# Patient Record
Sex: FEMALE | Race: WHITE | Employment: FULL TIME
[De-identification: names, ages, dates, MRNs, and addresses within clinical notes are randomized per-mention and may not be internally consistent; named-entity substitution may affect disease eponyms.]

---

## 2017-09-03 ENCOUNTER — HEALTH MAINTENANCE LETTER (OUTPATIENT)
Age: 32
End: 2017-09-03

## 2019-05-08 ENCOUNTER — TELEPHONE (OUTPATIENT)
Dept: FAMILY MEDICINE | Facility: CLINIC | Age: 34
End: 2019-05-08

## 2019-05-08 ENCOUNTER — OFFICE VISIT (OUTPATIENT)
Dept: URGENT CARE | Facility: URGENT CARE | Age: 34
End: 2019-05-08
Payer: COMMERCIAL

## 2019-05-08 VITALS
OXYGEN SATURATION: 97 % | TEMPERATURE: 98.4 F | BODY MASS INDEX: 40.27 KG/M2 | WEIGHT: 242 LBS | DIASTOLIC BLOOD PRESSURE: 84 MMHG | SYSTOLIC BLOOD PRESSURE: 134 MMHG | HEART RATE: 77 BPM

## 2019-05-08 DIAGNOSIS — B96.89 BV (BACTERIAL VAGINOSIS): ICD-10-CM

## 2019-05-08 DIAGNOSIS — N76.0 BV (BACTERIAL VAGINOSIS): ICD-10-CM

## 2019-05-08 DIAGNOSIS — N12 PYELONEPHRITIS: Primary | ICD-10-CM

## 2019-05-08 DIAGNOSIS — R10.9 ACUTE LEFT FLANK PAIN: ICD-10-CM

## 2019-05-08 PROBLEM — E66.01 MORBID OBESITY (H): Status: ACTIVE | Noted: 2019-05-08

## 2019-05-08 LAB
ALBUMIN SERPL-MCNC: 3.6 G/DL (ref 3.4–5)
ALBUMIN UR-MCNC: NEGATIVE MG/DL
ALP SERPL-CCNC: 46 U/L (ref 40–150)
ALT SERPL W P-5'-P-CCNC: 25 U/L (ref 0–50)
AMYLASE SERPL-CCNC: 47 U/L (ref 30–110)
ANION GAP SERPL CALCULATED.3IONS-SCNC: 8 MMOL/L (ref 3–14)
APPEARANCE UR: CLEAR
AST SERPL W P-5'-P-CCNC: 21 U/L (ref 0–45)
BACTERIA #/AREA URNS HPF: ABNORMAL /HPF
BILIRUB SERPL-MCNC: 0.8 MG/DL (ref 0.2–1.3)
BILIRUB UR QL STRIP: NEGATIVE
BUN SERPL-MCNC: 9 MG/DL (ref 7–30)
CALCIUM SERPL-MCNC: 9.4 MG/DL (ref 8.5–10.1)
CHLORIDE SERPL-SCNC: 103 MMOL/L (ref 94–109)
CO2 SERPL-SCNC: 29 MMOL/L (ref 20–32)
COLOR UR AUTO: YELLOW
CREAT SERPL-MCNC: 0.8 MG/DL (ref 0.52–1.04)
ERYTHROCYTE [DISTWIDTH] IN BLOOD BY AUTOMATED COUNT: 12.1 % (ref 10–15)
GFR SERPL CREATININE-BSD FRML MDRD: >90 ML/MIN/{1.73_M2}
GLUCOSE SERPL-MCNC: 87 MG/DL (ref 70–99)
GLUCOSE UR STRIP-MCNC: NEGATIVE MG/DL
HCG UR QL: NEGATIVE
HCT VFR BLD AUTO: 44.7 % (ref 35–47)
HGB BLD-MCNC: 15.3 G/DL (ref 11.7–15.7)
HGB UR QL STRIP: NEGATIVE
KETONES UR STRIP-MCNC: NEGATIVE MG/DL
LEUKOCYTE ESTERASE UR QL STRIP: ABNORMAL
LIPASE SERPL-CCNC: 129 U/L (ref 73–393)
MCH RBC QN AUTO: 30.5 PG (ref 26.5–33)
MCHC RBC AUTO-ENTMCNC: 34.2 G/DL (ref 31.5–36.5)
MCV RBC AUTO: 89 FL (ref 78–100)
NITRATE UR QL: NEGATIVE
NON-SQ EPI CELLS #/AREA URNS LPF: ABNORMAL /LPF
PH UR STRIP: 7 PH (ref 5–7)
PLATELET # BLD AUTO: 317 10E9/L (ref 150–450)
POTASSIUM SERPL-SCNC: 4.1 MMOL/L (ref 3.4–5.3)
PROT SERPL-MCNC: 7.3 G/DL (ref 6.8–8.8)
RBC # BLD AUTO: 5.01 10E12/L (ref 3.8–5.2)
RBC #/AREA URNS AUTO: ABNORMAL /HPF
SODIUM SERPL-SCNC: 140 MMOL/L (ref 133–144)
SOURCE: ABNORMAL
SP GR UR STRIP: 1.02 (ref 1–1.03)
SPECIMEN SOURCE: ABNORMAL
TSH SERPL DL<=0.005 MIU/L-ACNC: 0.85 MU/L (ref 0.4–4)
UROBILINOGEN UR STRIP-ACNC: 0.2 EU/DL (ref 0.2–1)
WBC # BLD AUTO: 8.7 10E9/L (ref 4–11)
WBC #/AREA URNS AUTO: ABNORMAL /HPF
WET PREP SPEC: ABNORMAL

## 2019-05-08 PROCEDURE — 81001 URINALYSIS AUTO W/SCOPE: CPT | Performed by: PHYSICIAN ASSISTANT

## 2019-05-08 PROCEDURE — 87210 SMEAR WET MOUNT SALINE/INK: CPT | Performed by: PHYSICIAN ASSISTANT

## 2019-05-08 PROCEDURE — 85027 COMPLETE CBC AUTOMATED: CPT | Performed by: PHYSICIAN ASSISTANT

## 2019-05-08 PROCEDURE — 83690 ASSAY OF LIPASE: CPT | Performed by: PHYSICIAN ASSISTANT

## 2019-05-08 PROCEDURE — 82150 ASSAY OF AMYLASE: CPT | Performed by: PHYSICIAN ASSISTANT

## 2019-05-08 PROCEDURE — 81025 URINE PREGNANCY TEST: CPT | Performed by: PHYSICIAN ASSISTANT

## 2019-05-08 PROCEDURE — 84443 ASSAY THYROID STIM HORMONE: CPT | Performed by: PHYSICIAN ASSISTANT

## 2019-05-08 PROCEDURE — 80053 COMPREHEN METABOLIC PANEL: CPT | Performed by: PHYSICIAN ASSISTANT

## 2019-05-08 PROCEDURE — 36415 COLL VENOUS BLD VENIPUNCTURE: CPT | Performed by: PHYSICIAN ASSISTANT

## 2019-05-08 PROCEDURE — 99204 OFFICE O/P NEW MOD 45 MIN: CPT | Performed by: PHYSICIAN ASSISTANT

## 2019-05-08 RX ORDER — METRONIDAZOLE 7.5 MG/G
1 GEL VAGINAL DAILY
Qty: 35 G | Refills: 0 | Status: SHIPPED | OUTPATIENT
Start: 2019-05-08 | End: 2019-05-15

## 2019-05-08 RX ORDER — SULFAMETHOXAZOLE/TRIMETHOPRIM 800-160 MG
1 TABLET ORAL 2 TIMES DAILY
Qty: 14 TABLET | Refills: 0 | Status: SHIPPED | OUTPATIENT
Start: 2019-05-08 | End: 2019-05-15

## 2019-05-08 RX ORDER — METRONIDAZOLE 500 MG/1
500 TABLET ORAL 2 TIMES DAILY
Qty: 14 TABLET | Refills: 0 | Status: SHIPPED | OUTPATIENT
Start: 2019-05-08 | End: 2019-05-15

## 2019-05-08 RX ORDER — OMEGA-3 FATTY ACIDS/FISH OIL 300-1000MG
200 CAPSULE ORAL EVERY 4 HOURS PRN
COMMUNITY

## 2019-05-08 NOTE — PATIENT INSTRUCTIONS
"Improvement in 48 to 72 hours   Return right away with worsening of symptoms as discussed      Patient Education     Bladder Infection, Female (Adult)    Urine is normally doesn't have any bacteria in it. But bacteria can get into the urinary tract from the skin around the rectum. Or they can travel in the blood from elsewhere in the body. Once they are in your urinary tract, they can cause infection in the urethra (urethritis), the bladder (cystitis), or the kidneys (pyelonephritis).  The most common place for an infection is in the bladder. This is called a bladder infection. This is one of the most common infections in women. Most bladder infections are easily treated. They are not serious unless the infection spreads to the kidney.  The phrases \"bladder infection,\" \"UTI,\" and \"cystitis\" are often used to describe the same thing. But they are not always the same. Cystitis is an inflammation of the bladder. The most common cause of cystitis is an infection.  Symptoms  The infection causes inflammation in the urethra and bladder. This causes many of the symptoms. The most common symptoms of a bladder infection are:    Pain or burning when urinating    Having to urinate more often than usual    Urgent need to urinate    Only a small amount of urine comes out    Blood in urine    Abdominal discomfort. This is usually in the lower abdomen above the pubic bone.    Cloudy urine    Strong- or bad-smelling urine    Unable to urinate (urinary retention)    Unable to hold urine in (urinary incontinence)    Fever    Loss of appetite    Confusion (in older adults)  Causes  Bladder infections are not contagious. You can't get one from someone else, from a toilet seat, or from sharing a bath.  The most common cause of bladder infections is bacteria from the bowels. The bacteria get onto the skin around the opening of the urethra. From there, they can get into the urine and travel up to the bladder, causing inflammation and " infection. This usually happens because of:    Wiping improperly after urinating. Always wipe from front to back.    Bowel incontinence    Pregnancy    Procedures such as having a catheter inserted    Older age    Not emptying your bladder. This can allow bacteria a chance to grow in your urine.    Dehydration    Constipation    Sex    Use of a diaphragm for birth control   Treatment  Bladder infections are diagnosed by a urine test. They are treated with antibiotics and usually clear up quickly without complications. Treatment helps prevent a more serious kidney infection.  Medicines  Medicines can help in the treatment of a bladder infection:    Take antibiotics until they are used up, even if you feel better. It is important to finish them to make sure the infection has cleared.    You can use acetaminophen or ibuprofen for pain, fever, or discomfort, unless another medicine was prescribed. If you have chronic liver or kidney disease, talk with your healthcare provider before using these medicines. Also talk with your provider if you've ever had a stomach ulcer or gastrointestinal bleeding, or are taking blood-thinner medicines.    If you are given phenazopydridine to reduce burning with urination, it will cause your urine to become a bright orange color. This can stain clothing.  Care and prevention  These self-care steps can help prevent future infections:    Drink plenty of fluids to prevent dehydration and flush out your bladder. Do this unless you must restrict fluids for other health reasons, or your doctor told you not to.    Proper cleaning after going to the bathroom is important. Wipe from front to back after using the toilet to prevent the spread of bacteria.    Urinate more often. Don't try to hold urine in for a long time.    Wear loose-fitting clothes and cotton underwear. Avoid tight-fitting pants.    Improve your diet and prevent constipation. Eat more fresh fruit and vegetables, and fiber, and  "less junk and fatty foods.    Avoid sex until your symptoms are gone.    Avoid caffeine, alcohol, and spicy foods. These can irritate your bladder.    Urinate right after intercourse to flush out your bladder.    If you use birth control pills and have frequent bladder infections, discuss it with your doctor.  Follow-up care  Call your healthcare provider if all symptoms are not gone after 3 days of treatment. This is especially important if you have repeat infections.  If a culture was done, you will be told if your treatment needs to be changed. If directed, you can call to find out the results.  If X-rays were done, you will be told if the results will affect your treatment.  Call 911  Call 911 if any of the following occur:    Trouble breathing    Hard to wake up or confusion    Fainting or loss of consciousness    Rapid heart rate  When to seek medical advice  Call your healthcare provider right away if any of these occur:    Fever of 100.4 F (38.0 C) or higher, or as directed by your healthcare provider    Symptoms are not better by the third day of treatment    Back or belly (abdominal) pain that gets worse    Repeated vomiting, or unable to keep medicine down    Weakness or dizziness    Vaginal discharge    Pain, redness, or swelling in the outer vaginal area (labia)  Date Last Reviewed: 10/1/2016    7555-4806 [employer]. [address] [place] [address], [place] [address]. All rights reserved. This information is not intended as a substitute for professional medical care. Always follow your healthcare professional's instructions.           Patient Education     Kidney Infection (Adult Female)    An infection in one or both kidneys is called \"pyelonephritis.\" It usually happens when bacteria (or rarely, viruses, fungi, or other disease-causing organisms) get into the kidney. The bacteria (or other disease-causing organisms) can enter the kidneys from the bladder or blood traveling from other parts of " the body. A kidney infection can become serious. It can cause severe illness, scarring of the kidneys, or kidney failure if not treated properly.  Common causes for this problem include:    Not keeping the genital area clean and dry, which promotes the growth of bacteria    Wiping back to front which drags bacteria from the rectum toward the urinary opening (urethra)    Wearing tight pants or underwear (this lets moisture build up in the genital area, which helps bacteria grow)    Holding urine in for long periods of time    Dehydration  Kidney infections can cause symptoms similar to a bladder infection. Symptoms include:    Pain (or burning) when urinating    Having to urinate more often than usual    Blood in the urine (pink or red)    Abdominal pain or discomfort, usually in the lower abdomen    Pain in the side or back    Pain above the pubic bone    Fever or chills    Vomiting    Loss of appetite  Treatment is oral antibiotics, or in more severe cases, intramuscular or IV antibiotics. These are started right away and may be changed once urine culture results determine the infecting organisms. Treatment helps prevent a more serious kidney infection.  Medicines  Medicines can help in the treatment of a bladder infection:    Take antibiotics until they are used up, even if you feel better. It is important to finish them to make sure the infection is gone.    Unless another medicine was prescribed, you can use over-the-counter medicines for pain, fever, or discomfort. If you have chronic liver of kidney disease, talk with your healthcare provider before using these medicines. Also talk with your provider if you've ever had a stomach ulcer or gastrointestinal (GI) bleeding, or are taking blood thinners.  Home care  The following are general care guidelines:    Stay home from work or school. Rest in bed until your fever breaks and you are feeling better, or as advised by your healthcare provider.    Drink lots of  fluid unless you must restrict fluids for other medical reasons. This will force the medicine into your urinary system and flush the bacteria out of your body. Ask your healthcare provider how much you should drink.    Don't have sex until you have finished all of your medicine and your symptoms are gone.    Don't have caffeine, alcohol, or spicy foods. These foods may irritate the kidneys and bladder.    Don't take bubble baths. Sensitivity to the chemicals in bubble baths can irritate the urethra.    Make sure you wipe from front to back after using the toilet.    Wear loose cloths and cotton underwear.  Prevention  These self-care steps can help prevent future infections:    Drink plenty of fluids to prevent dehydration and flush out the bladder. Do this unless you must restrict fluids for other health reasons, or your healthcare provider told you not to.    Proper cleaning after going to the bathroom in important. Make sure you wipe from front to back after using the toilet.    Urinate more often. Don't try to hold urine in for a long time.    Don't wear tight-fitting pants and underwear.    Improve your diet to prevent constipation. Eat more fruits, vegetables, and fiber. Eat less junk and fatty foods. Constipation can make a urinary tract infection more likely. Talk with your healthcare provider if you have trouble with bowel movements.    Urinate right after intercourse to flush out the bladder.  Follow-up care  Follow up with your healthcare provider, or as advised. Additional testing may be needed to make sure the infection has cleared. Close follow-up and further testing is very important to find the cause and to prevent future infections.  If a urine culture was done, you will be contacted if your treatment needs to be changed. If directed, you may call to find out the results.  If you had an X-ray, CT scan, or other diagnostic test, you will be notified of any new findings that may affect your  care.  Call 911  Call 911 if any of the following occur:    Trouble breathing    Fainting or loss of consciousness    Rapid or very slow heart rate    Weakness, dizziness, or fainting    Difficulty arousing or confusion  When to seek medical advice  Call your healthcare provider right away if any of these occur:    Fever 100.4 F (38 C) or higher, or as directed by your healthcare provider    Not feeling better within 1 to 2 days after starting antibiotics    Any symptom that continues after 3 days of treatment    Increasing pain in the stomach, back, side, or groin area    Repeated vomiting    Not able to take prescribed medicine due to nausea or another reason    Bloody, dark-colored, or foul smelling urine    Trouble urinating or decreased urine output    No urine for 8 hours, no tears when crying, sunken eyes, or dry mouth  Date Last Reviewed: 10/1/2016    [id number] [employer]. [address] [place] [address]. All rights reserved. This information is not intended as a substitute for professional medical care. Always follow your healthcare professional's instructions.           Patient Education     Bacterial Vaginosis    You have a vaginal infection called bacterial vaginosis (BV). Both good and bad bacteria are present in a healthy vagina. BV occurs when these bacteria get out of balance. The number of bad bacteria increase. And the number of good bacteria decrease. Although BV is associated with sexual activity, it is not a sexually transmitted disease.  BV may or may not cause symptoms. If symptoms do occur, they can include:    Thin, gray, milky-white, or sometimes green discharge    Unpleasant odor or  fishy  smell    Itching, burning, or pain in or around the vagina  It is not known what causes BV, but certain factors can make the problem more likely. This can include:    Douching    Having sex with a new partner    Having sex with more than one partner  BV will sometimes go away on  its own. But treatment is usually recommended. This is because untreated BV can increase the risk of more serious health problems such as:    Pelvic inflammatory disease (PID)     delivery (giving birth to a baby early if you re pregnant)    HIV and certain other sexually transmitted diseases (STDs)    Infection after surgery on the reproductive organs  Home care  General care    BV is most often treated with medicines called antibiotics. These may be given as pills or as a vaginal cream. If antibiotics are prescribed, be sure to use them exactly as directed. Also, be sure to complete all of the medicine, even if your symptoms go away.    Don't douche or having sex during treatment.    If you have sex with a female partner, ask your healthcare provider if she should also be treated.  Prevention    Don't douche.    Don't have sex. If you do have sex, then take steps to lower your risk:  ? Use condoms when having sex.  ? Limit the number of sexual partners you have.  Follow-up care  Follow up with your healthcare provider, or as advised.  When to seek medical advice  Call your healthcare provider right away if:    You have a fever of 100.4 F (38 C) or higher, or as directed by your provider.    Your symptoms worsen, or they don t go away within a few days of starting treatment.    You have new pain in the lower belly or pelvic region.    You have side effects that bother you or a reaction to the pills or cream you re prescribed.    You or any partners you have sex with have new symptoms, such as a rash, joint pain, or sores.  Date Last Reviewed: 10/1/2017    [id number] [employer]. [address] [place] [address], [place] [address]. All rights reserved. This information is not intended as a substitute for professional medical care. Always follow your healthcare professional's instructions.

## 2019-05-08 NOTE — TELEPHONE ENCOUNTER
Called patient- let her know that an rx for flagyl was sent to her pharmacy.    Pt also requested a letter stating she was seen in urgent care today- I will print off work note and leave it at the urg care  for her today    Ilene Coates CMA 5/8/2019 1:55 PM

## 2019-05-08 NOTE — TELEPHONE ENCOUNTER
Reason for call:  Other   Patient called regarding (reason for call): call back  Additional comments: Patient was prescribed metrogel. States that it is too expensive and she is unable to fill it. She is wondering if she needs the metrogel or if she can get by with just the bactrim.  Please call to discuss.     Phone number to reach patient:  Home number on file [phone] (home)    Best Time:  any    Can we leave a detailed message on this number?  YES

## 2019-05-08 NOTE — PROGRESS NOTES
SUBJECTIVE  HPI:  Fiona Esparza is a 33 year old female who presents with the CC of left flank pain, radiating to left CVA.  Nausea. Frequency of urine. Urgency.  No dysuria. History of stones, UTIs (while pregnant).  Increased discharge.  Positive pregnancy test 1 week ago, then negative x3.  The pain is characterized as constant and aching, and tender.  At worst is a level 7 on a scale of 1-10.  Now a 7.  Pain has been present for 1 week(s) and is slowly progressive.  EXACERBATING FACTORS: wearing jeans, pressure, laying on it.  RELIEVING FACTORS: nothing  ASSOCIATED SX: as above.    Switched back [employer] after a few years with [employer] due to insurance    Past Medical History:   Diagnosis Date     Costochondritis      GERD (gastroesophageal reflux disease)    Lithotripsy for stones 3 years ago      Current Outpatient Medications   Medication Sig Dispense Refill     AVIANE 0.1-20 MG-MCG per tablet Take one tablet by mouth one time daily 84 tablet 2     ibuprofen (ADVIL/MOTRIN) 200 MG capsule Take 200 mg by mouth every 4 hours as needed for fever       Social History     Tobacco Use     Smoking status: Never Smoker     Smokeless tobacco: Never Used   Substance Use Topics     Alcohol use: No     Surgical History  Umblical hernia repair 2-5yrs ago  Appendix removed >10 years    Family History  No family history of stones, gall bladder  Paternal grandfather with colon or pancreatic cancer at 74  Mother with thyroid issues      ROS:  10 point ROS negative except as listed above      OBJECTIVE:  /84   Pulse 77   Temp 98.4  F (36.9  C) (Tympanic)   Wt 109.8 kg (242 lb)   SpO2 97%   BMI 40.27 kg/m    GENERAL APPEARANCE: healthy, alert and no distress. Non-ill, non-toxic  EYES: EOMI,  PERRL, conjunctiva clear  HENT: ear canals and TM's normal.  Nose and mouth without ulcers, erythema or lesions  NECK: supple, nontender, no lymphadenopathy  RESP: lungs clear to auscultation - no rales, rhonchi or wheezes  CV:  regular rates and rhythm, normal S1 S2, no murmur noted  BACK: CVA tenderness on left side  ABDOMEN:  soft, nontender, no HSM or masses and bowel sounds normal  NEURO: Normal strength and tone, sensory exam grossly normal,  normal speech and mentation  SKIN: no suspicious lesions or rashes    Results for orders placed or performed in visit on 05/08/19   *UA reflex to Microscopic and Culture ([place] [facility] (except [place] and [place])   Result Value Ref Range    Color Urine Yellow     Appearance Urine Clear     Glucose Urine Negative NEG^Negative mg/dL    Bilirubin Urine Negative NEG^Negative    Ketones Urine Negative NEG^Negative mg/dL    Specific Gravity Urine 1.020 1.003 - 1.035    Blood Urine Negative NEG^Negative    pH Urine 7.0 5.0 - 7.0 pH    Protein Albumin Urine Negative NEG^Negative mg/dL    Urobilinogen Urine 0.2 0.2 - 1.0 EU/dL    Nitrite Urine Negative NEG^Negative    Leukocyte Esterase Urine Small (A) NEG^Negative    Source Midstream Urine    Comprehensive metabolic panel (BMP + Alb, Alk Phos, ALT, AST, Total. Bili, TP)   Result Value Ref Range    Sodium 140 133 - 144 mmol/L    Potassium 4.1 3.4 - 5.3 mmol/L    Chloride 103 94 - 109 mmol/L    Carbon Dioxide 29 20 - 32 mmol/L    Anion Gap 8 3 - 14 mmol/L    Glucose 87 70 - 99 mg/dL    Urea Nitrogen 9 7 - 30 mg/dL    Creatinine 0.80 0.52 - 1.04 mg/dL    GFR Estimate >90 >60 mL/min/[1.73_m2]    GFR Estimate If Black >90 >60 mL/min/[1.73_m2]    Calcium 9.4 8.5 - 10.1 mg/dL    Bilirubin Total 0.8 0.2 - 1.3 mg/dL    Albumin 3.6 3.4 - 5.0 g/dL    Protein Total 7.3 6.8 - 8.8 g/dL    Alkaline Phosphatase 46 40 - 150 U/L    ALT 25 0 - 50 U/L    AST 21 0 - 45 U/L   HCG Qual, Urine ([id number])   Result Value Ref Range    HCG Qual Urine Negative NEG^Negative   CBC with platelets   Result Value Ref Range    WBC 8.7 4.0 - 11.0 10e9/L    RBC Count 5.01 3.8 - 5.2 10e12/L    Hemoglobin 15.3 11.7 - 15.7 g/dL    Hematocrit 44.7 35.0 - 47.0 %    MCV 89 78 -  100 fl    MCH 30.5 26.5 - 33.0 pg    MCHC 34.2 31.5 - 36.5 g/dL    RDW 12.1 10.0 - 15.0 %    Platelet Count 317 150 - 450 10e9/L   Urine Microscopic   Result Value Ref Range    WBC Urine 5-10 (A) OTO5^0 - 5 /HPF    RBC Urine O - 2 OTO2^O - 2 /HPF    Squamous Epithelial /LPF Urine Moderate (A) FEW^Few /LPF    Bacteria Urine Few (A) NEG^Negative /HPF   Wet prep   Result Value Ref Range    Specimen Description Vagina     Wet Prep No Trichomonas seen     Wet Prep No yeast seen     Wet Prep Moderate  Clue cells seen   (A)     Wet Prep Moderate  WBC'S seen            ASSESSMENT:  (N12) Pyelonephritis  (primary encounter diagnosis)  Comment: nontoxic, non-ill appearing, low WBC.  Holding on rocephin, discussed red flags and close follow up  Plan: sulfamethoxazole-trimethoprim (BACTRIM         DS/SEPTRA DS) 800-160 MG tablet  Red flags and emergent follow up discussed, and understood by patient  Follow up with PCP if symptoms worsen or fail to improve      (R10.9) Acute left flank pain  Plan: *UA reflex to Microscopic and Culture ([place]         and [facility] (except [place] and         [place]), Wet prep, Comprehensive metabolic         panel (BMP + Alb, Alk Phos, ALT, AST, Total.         Bili, TP), Amylase, Lipase, HCG Qual, Urine         ([id number]), CBC with platelets, TSH, Urine         Microscopic      (N76.0,  B96.89) BV (bacterial vaginosis)  Comment: clue cells on wet prep  Plan: metroNIDAZOLE (METROGEL) 0.75 % vaginal gel  Follow up with PCP if symptoms worsen or fail to improve      Patient Instructions   Improvement in 48 to 72 hours   Return right away with worsening of symptoms as discussed      Patient Education     Bladder Infection, Female (Adult)    Urine is normally doesn't have any bacteria in it. But bacteria can get into the urinary tract from the skin around the rectum. Or they can travel in the blood from elsewhere in the body. Once they are in your urinary tract, they can cause infection in  "the urethra (urethritis), the bladder (cystitis), or the kidneys (pyelonephritis).  The most common place for an infection is in the bladder. This is called a bladder infection. This is one of the most common infections in women. Most bladder infections are easily treated. They are not serious unless the infection spreads to the kidney.  The phrases \"bladder infection,\" \"UTI,\" and \"cystitis\" are often used to describe the same thing. But they are not always the same. Cystitis is an inflammation of the bladder. The most common cause of cystitis is an infection.  Symptoms  The infection causes inflammation in the urethra and bladder. This causes many of the symptoms. The most common symptoms of a bladder infection are:    Pain or burning when urinating    Having to urinate more often than usual    Urgent need to urinate    Only a small amount of urine comes out    Blood in urine    Abdominal discomfort. This is usually in the lower abdomen above the pubic bone.    Cloudy urine    Strong- or bad-smelling urine    Unable to urinate (urinary retention)    Unable to hold urine in (urinary incontinence)    Fever    Loss of appetite    Confusion (in older adults)  Causes  Bladder infections are not contagious. You can't get one from someone else, from a toilet seat, or from sharing a bath.  The most common cause of bladder infections is bacteria from the bowels. The bacteria get onto the skin around the opening of the urethra. From there, they can get into the urine and travel up to the bladder, causing inflammation and infection. This usually happens because of:    Wiping improperly after urinating. Always wipe from front to back.    Bowel incontinence    Pregnancy    Procedures such as having a catheter inserted    Older age    Not emptying your bladder. This can allow bacteria a chance to grow in your urine.    Dehydration    Constipation    Sex    Use of a diaphragm for birth control   Treatment  Bladder infections are " diagnosed by a urine test. They are treated with antibiotics and usually clear up quickly without complications. Treatment helps prevent a more serious kidney infection.  Medicines  Medicines can help in the treatment of a bladder infection:    Take antibiotics until they are used up, even if you feel better. It is important to finish them to make sure the infection has cleared.    You can use acetaminophen or ibuprofen for pain, fever, or discomfort, unless another medicine was prescribed. If you have chronic liver or kidney disease, talk with your healthcare provider before using these medicines. Also talk with your provider if you've ever had a stomach ulcer or gastrointestinal bleeding, or are taking blood-thinner medicines.    If you are given phenazopydridine to reduce burning with urination, it will cause your urine to become a bright orange color. This can stain clothing.  Care and prevention  These self-care steps can help prevent future infections:    Drink plenty of fluids to prevent dehydration and flush out your bladder. Do this unless you must restrict fluids for other health reasons, or your doctor told you not to.    Proper cleaning after going to the bathroom is important. Wipe from front to back after using the toilet to prevent the spread of bacteria.    Urinate more often. Don't try to hold urine in for a long time.    Wear loose-fitting clothes and cotton underwear. Avoid tight-fitting pants.    Improve your diet and prevent constipation. Eat more fresh fruit and vegetables, and fiber, and less junk and fatty foods.    Avoid sex until your symptoms are gone.    Avoid caffeine, alcohol, and spicy foods. These can irritate your bladder.    Urinate right after intercourse to flush out your bladder.    If you use birth control pills and have frequent bladder infections, discuss it with your doctor.  Follow-up care  Call your healthcare provider if all symptoms are not gone after 3 days of treatment.  "This is especially important if you have repeat infections.  If a culture was done, you will be told if your treatment needs to be changed. If directed, you can call to find out the results.  If X-rays were done, you will be told if the results will affect your treatment.  Call 911  Call 911 if any of the following occur:    Trouble breathing    Hard to wake up or confusion    Fainting or loss of consciousness    Rapid heart rate  When to seek medical advice  Call your healthcare provider right away if any of these occur:    Fever of 100.4 F (38.0 C) or higher, or as directed by your healthcare provider    Symptoms are not better by the third day of treatment    Back or belly (abdominal) pain that gets worse    Repeated vomiting, or unable to keep medicine down    Weakness or dizziness    Vaginal discharge    Pain, redness, or swelling in the outer vaginal area (labia)  Date Last Reviewed: 10/1/2016    0519-1520 [employer]. [address] [place] [address]. All rights reserved. This information is not intended as a substitute for professional medical care. Always follow your healthcare professional's instructions.           Patient Education     Kidney Infection (Adult Female)    An infection in one or both kidneys is called \"pyelonephritis.\" It usually happens when bacteria (or rarely, viruses, fungi, or other disease-causing organisms) get into the kidney. The bacteria (or other disease-causing organisms) can enter the kidneys from the bladder or blood traveling from other parts of the body. A kidney infection can become serious. It can cause severe illness, scarring of the kidneys, or kidney failure if not treated properly.  Common causes for this problem include:    Not keeping the genital area clean and dry, which promotes the growth of bacteria    Wiping back to front which drags bacteria from the rectum toward the urinary opening (urethra)    Wearing tight pants or underwear (this lets " moisture build up in the genital area, which helps bacteria grow)    Holding urine in for long periods of time    Dehydration  Kidney infections can cause symptoms similar to a bladder infection. Symptoms include:    Pain (or burning) when urinating    Having to urinate more often than usual    Blood in the urine (pink or red)    Abdominal pain or discomfort, usually in the lower abdomen    Pain in the side or back    Pain above the pubic bone    Fever or chills    Vomiting    Loss of appetite  Treatment is oral antibiotics, or in more severe cases, intramuscular or IV antibiotics. These are started right away and may be changed once urine culture results determine the infecting organisms. Treatment helps prevent a more serious kidney infection.  Medicines  Medicines can help in the treatment of a bladder infection:    Take antibiotics until they are used up, even if you feel better. It is important to finish them to make sure the infection is gone.    Unless another medicine was prescribed, you can use over-the-counter medicines for pain, fever, or discomfort. If you have chronic liver of kidney disease, talk with your healthcare provider before using these medicines. Also talk with your provider if you've ever had a stomach ulcer or gastrointestinal (GI) bleeding, or are taking blood thinners.  Home care  The following are general care guidelines:    Stay home from work or school. Rest in bed until your fever breaks and you are feeling better, or as advised by your healthcare provider.    Drink lots of fluid unless you must restrict fluids for other medical reasons. This will force the medicine into your urinary system and flush the bacteria out of your body. Ask your healthcare provider how much you should drink.    Don't have sex until you have finished all of your medicine and your symptoms are gone.    Don't have caffeine, alcohol, or spicy foods. These foods may irritate the kidneys and bladder.    Don't take  bubble baths. Sensitivity to the chemicals in bubble baths can irritate the urethra.    Make sure you wipe from front to back after using the toilet.    Wear loose cloths and cotton underwear.  Prevention  These self-care steps can help prevent future infections:    Drink plenty of fluids to prevent dehydration and flush out the bladder. Do this unless you must restrict fluids for other health reasons, or your healthcare provider told you not to.    Proper cleaning after going to the bathroom in important. Make sure you wipe from front to back after using the toilet.    Urinate more often. Don't try to hold urine in for a long time.    Don't wear tight-fitting pants and underwear.    Improve your diet to prevent constipation. Eat more fruits, vegetables, and fiber. Eat less junk and fatty foods. Constipation can make a urinary tract infection more likely. Talk with your healthcare provider if you have trouble with bowel movements.    Urinate right after intercourse to flush out the bladder.  Follow-up care  Follow up with your healthcare provider, or as advised. Additional testing may be needed to make sure the infection has cleared. Close follow-up and further testing is very important to find the cause and to prevent future infections.  If a urine culture was done, you will be contacted if your treatment needs to be changed. If directed, you may call to find out the results.  If you had an X-ray, CT scan, or other diagnostic test, you will be notified of any new findings that may affect your care.  Call 911  Call 911 if any of the following occur:    Trouble breathing    Fainting or loss of consciousness    Rapid or very slow heart rate    Weakness, dizziness, or fainting    Difficulty arousing or confusion  When to seek medical advice  Call your healthcare provider right away if any of these occur:    Fever 100.4 F (38 C) or higher, or as directed by your healthcare provider    Not feeling better within 1 to 2  days after starting antibiotics    Any symptom that continues after 3 days of treatment    Increasing pain in the stomach, back, side, or groin area    Repeated vomiting    Not able to take prescribed medicine due to nausea or another reason    Bloody, dark-colored, or foul smelling urine    Trouble urinating or decreased urine output    No urine for 8 hours, no tears when crying, sunken eyes, or dry mouth  Date Last Reviewed: 10/1/2016    4679-1356 [employer]. [address] [place] [address]. All rights reserved. This information is not intended as a substitute for professional medical care. Always follow your healthcare professional's instructions.           Patient Education     Bacterial Vaginosis    You have a vaginal infection called bacterial vaginosis (BV). Both good and bad bacteria are present in a healthy vagina. BV occurs when these bacteria get out of balance. The number of bad bacteria increase. And the number of good bacteria decrease. Although BV is associated with sexual activity, it is not a sexually transmitted disease.  BV may or may not cause symptoms. If symptoms do occur, they can include:    Thin, gray, milky-white, or sometimes green discharge    Unpleasant odor or  fishy  smell    Itching, burning, or pain in or around the vagina  It is not known what causes BV, but certain factors can make the problem more likely. This can include:    Douching    Having sex with a new partner    Having sex with more than one partner  BV will sometimes go away on its own. But treatment is usually recommended. This is because untreated BV can increase the risk of more serious health problems such as:    Pelvic inflammatory disease (PID)     delivery (giving birth to a baby early if you re pregnant)    HIV and certain other sexually transmitted diseases (STDs)    Infection after surgery on the reproductive organs  Home care  General care    BV is most often treated with  medicines called antibiotics. These may be given as pills or as a vaginal cream. If antibiotics are prescribed, be sure to use them exactly as directed. Also, be sure to complete all of the medicine, even if your symptoms go away.    Don't douche or having sex during treatment.    If you have sex with a female partner, ask your healthcare provider if she should also be treated.  Prevention    Don't douche.    Don't have sex. If you do have sex, then take steps to lower your risk:  ? Use condoms when having sex.  ? Limit the number of sexual partners you have.  Follow-up care  Follow up with your healthcare provider, or as advised.  When to seek medical advice  Call your healthcare provider right away if:    You have a fever of 100.4 F (38 C) or higher, or as directed by your provider.    Your symptoms worsen, or they don t go away within a few days of starting treatment.    You have new pain in the lower belly or pelvic region.    You have side effects that bother you or a reaction to the pills or cream you re prescribed.    You or any partners you have sex with have new symptoms, such as a rash, joint pain, or sores.  Date Last Reviewed: 10/1/2017    0071-9398 [employer]. [address] [place] [address], [place] [address]. All rights reserved. This information is not intended as a substitute for professional medical care. Always follow your healthcare professional's instructions.

## 2019-11-07 ENCOUNTER — HEALTH MAINTENANCE LETTER (OUTPATIENT)
Age: 34
End: 2019-11-07

## 2020-02-23 ENCOUNTER — HEALTH MAINTENANCE LETTER (OUTPATIENT)
Age: 35
End: 2020-02-23

## 2020-12-06 ENCOUNTER — HEALTH MAINTENANCE LETTER (OUTPATIENT)
Age: 35
End: 2020-12-06

## 2021-09-25 ENCOUNTER — HEALTH MAINTENANCE LETTER (OUTPATIENT)
Age: 36
End: 2021-09-25

## 2022-01-15 ENCOUNTER — HEALTH MAINTENANCE LETTER (OUTPATIENT)
Age: 37
End: 2022-01-15

## 2022-12-26 ENCOUNTER — HEALTH MAINTENANCE LETTER (OUTPATIENT)
Age: 37
End: 2022-12-26

## 2023-04-22 ENCOUNTER — HEALTH MAINTENANCE LETTER (OUTPATIENT)
Age: 38
End: 2023-04-22